# Patient Record
(demographics unavailable — no encounter records)

---

## 2025-01-07 NOTE — CONSULT LETTER
[Dear  ___] : Dear  [unfilled], [Consult Letter:] : I had the pleasure of evaluating your patient, [unfilled]. [( Thank you for referring [unfilled] for consultation for _____ )] : Thank you for referring [unfilled] for consultation for [unfilled] [Please see my note below.] : Please see my note below. [Consult Closing:] : Thank you very much for allowing me to participate in the care of this patient.  If you have any questions, please do not hesitate to contact me. [Sincerely,] : Sincerely, [FreeTextEntry2] : Dr. Arabella Conte (PCP/Ref) [FreeTextEntry3] : Deo Hill MD, MPH \par  System Director of Thoracic Surgery \par  Director of Comprehensive Lung and Foregut [place] \par  Professor Cardiovascular & Thoracic Surgery \par  [facility] [employer] at [facility]\par  \par

## 2025-01-07 NOTE — ASSESSMENT
[FreeTextEntry1] : Mr. TOMAS SOTO, 82 year old male, former smoker, w/ hx of HTN, congenital heart disease s/p open heart sx in 1960s, COPD, who presented to his PCP Dr. Arabella Conte with cough x 6 months, was sent for CT scan.   Now 2yrs 6mons s/p Rt VATS, R.A., lysis of pulmonary adhesion, wedge rxn of RLL, MLND, wedge rxn of additional RLL staple margin on 7/27/22. Path revealed lepidic predominant AdenoCA, non-mucinous type with area of invasion showing acinar and micropapillary patterns, 1.1 cm, G1, all margins and LNs are negative, [id number], Stg IA1.  [place] [facility] [place] [facility] [place] The ascending thoracic aorta is dilated measuring 4.1 cm, unchanged heart size within normal - There is a mild degree of emphysema. - There is mild to moderate bronchiolectasis and scarring in the left lung, unchanged.  - There is atelectasis in the left lower lobe posteriorly. - There are stable nodules for example 7 mm nodule left upper lobe series 5 image 60.  - There is stable nodular thickening on the oblique fissure in the right lung there is a subpleural area of scarring in the anterior right middle lobe, unchanged. - The previously described nodule in the anterior right lower lobe is not visualized although evaluation on the current study is limited due to motion artifact.  - There are stable postoperative changes with surgical sutures and scarring in the medial right lower lobe. - There is stable nodularity and scarring in the lung apices. - There is mild left pleural thickening and a small left pleural effusion. - There is a 5.2 cm cyst in the upper pole left kidney.   I have reviewed the patient's medical records and diagnostic images at time of this office consultation and have made the following recommendation: 1.

## 2025-01-07 NOTE — HISTORY OF PRESENT ILLNESS
[FreeTextEntry1] : Mr. TOMAS SOTO, 82 year old male, former smoker, w/ hx of HTN, congenital heart disease s/p open heart sx in 1960s, COPD, who presented to his PCP Dr. Arabella Conte with cough x 6 months, was sent for CT scan.   CT chest on 06/13/2022:  - a subsolid nodule in RLL (3:126) with the groundglass component measuring 10 mm and internal solid component measuring 6 mm.  - scarring, bronchiectasis, bronchial mucoid impaction MATT, RML, lingula, and rul.  - calcified right paratracheal and right hilar LNs  PFTs on 6/25/22: %, FEV1 131%, DLCO 76%.  PET/CT on 6/30/22: - there is decreased in size 1.9 cm SUV=3.7 spiculated nodular opacity at the Lt lung apex, likely due to inflammation - there is 1 cm SUV=1.6 subsolid nodule in medial RLL, suspicious for low grade neoplasm - a few bilateral renal cysts, up to 4.8 cm on the Lt  - moderate to severe prostate gland  Now 2yrs 6mons s/p Rt VATS, R.A., lysis of pulmonary adhesion, wedge rxn of RLL, MLND, wedge rxn of additional RLL staple margin on 7/27/22. Path revealed lepidic predominant AdenoCA, non-mucinous type with area of invasion showing acinar and micropapillary patterns, 1.1 cm, G1, all margins and LNs are negative, [id number], Stg IA1.  CT Chest on 11/2/23 at [facility]: - post-op changes - pulmonary opacification within the subpleural superior segment of the LLL measuring up to 6.9 x 1.6 cm in maximal dimension on the axial imaging (5, 125), not significantly changed since November 22, 2022 (but new as compared with more remote prior imaging); please note that there is associated volume loss, architectural distortion, and bronchiectasis (9, 37).  - There is a new 4 mm RLL nodule (5, 175). - There are unchanged sub-centimeter calcified granulomas within the lungs. - There is persistent biapical pleuroparenchymal thickening/scarring.  - There is a persistent trace loculated right pleural effusion, as on prior imaging. There is no left pleural effusion; no pneumothorax on either side.  CT Chest on 4/10/24 at [facility]: - post-op changes with volume loss, scarring and small amount of loculated fluid (series 2, image 131). - Left greater than right apical bulla are noted. - Stable left apical nodular scarring, architectural distortion, volume loss and bronchiectasis. There is also subpleural scarring bronchiectasis within the posterior superior segment of the left lower lobe, unchanged. - Previously noted new bilateral 4 mm right lower lobe nodule is no longer visualized, however there is a new medial right lower lobe 6 mm nodule (series 3, image 173). - Stable 3 mm anterior right lower lobe nodule (series 3, image 77). - No pneumothorax. - No pathologically enlarged lymph nodes. Calcified mediastinal and right hilar lymph nodes are seen. - Stable left renal 5.4 cm cyst.  CT chest on 7/11/24 at [facility]: - Postoperative changes of the right lower lobe are again visualized.  - Mild to moderate emphysematous changes are seen.  - Patchy areas of bilateral scarring and/or atelectasis are visualized, with architectural distortion and mild to moderate bronchiectasis, worse involving the left lung, similar to the previous examination. Dependent atelectasis/consolidation is seen within the superior segment of the left lower lobe, with air bronchograms, not significantly changed. - A nodule is visualized within the right lower lobe anteriorly (series 5 image 114), measuring 3 mm, not significantly changed.  - A nodule is visualized within the right lower lobe posterior medially (series 5 image 58), measuring 5 mm, not significantly changed.  - Several additional smaller nodules are seen, not significantly changed. No new nodules are visualized. - Calcified mediastinal and right hilar lymph nodes are seen. No noncalcified enlarged mediastinal or hilar lymph nodes are seen.  CT chest on 1/06/2025 at [facility]: - The ascending thoracic aorta is dilated measuring 4.1 cm, unchanged heart size within normal - There is a mild degree of emphysema. - There is mild to moderate bronchiolectasis and scarring in the left lung, unchanged.  - There is atelectasis in the left lower lobe posteriorly. - There are stable nodules for example 7 mm nodule left upper lobe series 5 image 60.  - There is stable nodular thickening on the oblique fissure in the right lung there is a subpleural area of scarring in the anterior right middle lobe, unchanged. - The previously described nodule in the anterior right lower lobe is not visualized although evaluation on the current study is limited due to motion artifact.  - There are stable postoperative changes with surgical sutures and scarring in the medial right lower lobe. - There is stable nodularity and scarring in the lung apices. - There is mild left pleural thickening and a small left pleural effusion. - There is a 5.2 cm cyst in the upper pole left kidney.   Pt presents today for follow up.

## 2025-01-12 NOTE — CONSULT LETTER
[FreeTextEntry2] : Dr. Arabella Conte (PCP/Ref) [FreeTextEntry3] : Deo Hill MD, MPH \par  System Director of Thoracic Surgery \par  Director of Comprehensive Lung and Foregut [place] \par  Professor Cardiovascular & Thoracic Surgery \par  [facility] [employer] at [facility]\par  \par

## 2025-01-12 NOTE — HISTORY OF PRESENT ILLNESS
[FreeTextEntry1] : Mr. TOMAS SOTO, 82 year old male, former smoker, w/ hx of HTN, congenital heart disease s/p open heart sx in 1960s, COPD, who presented to his PCP Dr. Arabella Conte with cough x 6 months, was sent for CT scan.   CT chest on 06/13/2022:  - a subsolid nodule in RLL (3:126) with the groundglass component measuring 10 mm and internal solid component measuring 6 mm.  - scarring, bronchiectasis, bronchial mucoid impaction MATT, RML, lingula, and rul.  - calcified right paratracheal and right hilar LNs  PFTs on 6/25/22: %, FEV1 131%, DLCO 76%.  PET/CT on 6/30/22: - there is decreased in size 1.9 cm SUV=3.7 spiculated nodular opacity at the Lt lung apex, likely due to inflammation - there is 1 cm SUV=1.6 subsolid nodule in medial RLL, suspicious for low grade neoplasm - a few bilateral renal cysts, up to 4.8 cm on the Lt  - moderate to severe prostate gland  Now 2.5 yrs s/p Rt VATS, R.A., lysis of pulmonary adhesion, wedge rxn of RLL, MLND, wedge rxn of additional RLL staple margin on 7/27/22. Path revealed lepidic predominant AdenoCA, non-mucinous type with area of invasion showing acinar and micropapillary patterns, 1.1 cm, G1, all margins and LNs are negative, [id number], Stg IA1.  CT Chest on 4/10/24 [place] [facility] [place]: - post-op changes with volume loss, scarring and small amount of loculated fluid (series 2, image 131). - Left greater than right apical bulla are noted. - Stable left apical nodular scarring, architectural distortion, volume loss and bronchiectasis. There is also subpleural scarring bronchiectasis within the posterior superior segment of the left lower lobe, unchanged. - Previously noted new bilateral 4 mm right lower lobe nodule is no longer visualized, however there is a new medial right lower lobe 6 mm nodule (series 3, image 173). - Stable 3 mm anterior right lower lobe nodule (series 3, image 77). - No pneumothorax. - No pathologically enlarged lymph nodes. Calcified mediastinal and right hilar lymph nodes are seen. - Stable left renal 5.4 cm cyst.  CT chest on 7/11/24 at [facility]: - Postoperative changes of the right lower lobe are again visualized.  - patchy areas of bilateral scarring and/or atelectasis are visualized, with architectural distortion and mild to moderate bronchiectasis, worse involving the left lung, similar to the previous examination.  - stable nodules are visualized within the right lower lobe measuring 3 mm, 5mm - Calcified mediastinal and right hilar lymph nodes are seen. No noncalcified enlarged mediastinal or hilar lymph nodes are seen.  CT chest on 1/06/2025 at [facility]: - ascending thoracic aorta is dilated measuring 4.1 cm, unchanged heart size within normal - there are stable nodules for example 7 mm nodule left upper lobe series 5 image 60.  - there is stable nodular thickening on the oblique fissure in the right lung there is a subpleural area of scarring in the anterior right middle lobe - The previously described nodule in the anterior right lower lobe is not visualized although evaluation on the current study is limited due to motion artifact.  - there is mild left pleural thickening and a small left pleural effusion. - there is a 5.2 cm cyst in the upper pole left kidney.   Pt presents today for follow up.

## 2025-01-12 NOTE — ASSESSMENT
[FreeTextEntry1] : Mr. TOMAS SOTO, 82 year old male, former smoker, w/ hx of HTN, congenital heart disease s/p open heart sx in 1960s, COPD, who presented to his PCP Dr. Arabella Cotne with cough x 6 months, was sent for CT scan.   Now 2.5 yrs s/p Rt VATS, R.A., lysis of pulmonary adhesion, wedge rxn of RLL, MLND, wedge rxn of additional RLL staple margin on 7/27/22. Path revealed lepidic predominant AdenoCA, non-mucinous type with area of invasion showing acinar and micropapillary patterns, 1.1 cm, G1, all margins and LNs are negative, [id number], Stg IA1.  [place] chest on 1/06/2025 [place] [facility] [place] - ascending thoracic aorta is dilated measuring 4.1 cm, unchanged heart size within normal - there are stable nodules for example 7 mm nodule left upper lobe series 5 image 60.  - there is stable nodular thickening on the oblique fissure in the right lung there is a subpleural area of scarring in the anterior right middle lobe - The previously described nodule in the anterior right lower lobe is not visualized although evaluation on the current study is limited due to motion artifact.  - there is mild left pleural thickening and a small left pleural effusion. - there is a 5.2 cm cyst in the upper pole left kidney.   I have reviewed the patient's medical records and diagnostic images at time of this office consultation and have made the following recommendation: 1.

## 2025-01-17 NOTE — CONSULT LETTER
[FreeTextEntry2] : Dr. Arabella Conte (PCP/Ref) [FreeTextEntry3] : Deo Hill MD, MPH \par  System Director of Thoracic Surgery \par  Director of Comprehensive Lung and Foregut [place] \par  Professor Cardiovascular & Thoracic Surgery \par  [facility] [employer] at [facility]\par  \par

## 2025-01-17 NOTE — HISTORY OF PRESENT ILLNESS
[FreeTextEntry1] : Mr. TOMAS SOTO, 82 year old male, former smoker, w/ hx of HTN, congenital heart disease s/p open heart sx in 1960s, COPD, who presented to his PCP Dr. Arabella Conte with cough x 6 months, was sent for CT scan.   CT chest on 06/13/2022:  - a subsolid nodule in RLL (3:126) with the groundglass component measuring 10 mm and internal solid component measuring 6 mm.  - scarring, bronchiectasis, bronchial mucoid impaction MATT, RML, lingula, and rul.  - calcified right paratracheal and right hilar LNs  PFTs on 6/25/22: %, FEV1 131%, DLCO 76%.  PET/CT on 6/30/22: - there is decreased in size 1.9 cm SUV=3.7 spiculated nodular opacity at the Lt lung apex, likely due to inflammation - there is 1 cm SUV=1.6 subsolid nodule in medial RLL, suspicious for low grade neoplasm - a few bilateral renal cysts, up to 4.8 cm on the Lt  - moderate to severe prostate gland  Now 2.5 yrs s/p Rt VATS, R.A., lysis of pulmonary adhesion, wedge rxn of RLL, MLND, wedge rxn of additional RLL staple margin on 7/27/22. Path revealed lepidic predominant AdenoCA, non-mucinous type with area of invasion showing acinar and micropapillary patterns, 1.1 cm, G1, all margins and LNs are negative, [id number], Stg IA1.  CT Chest on 4/10/24 at [facility] [place]: - post-op changes with volume loss, scarring and small amount of loculated fluid (series 2, image 131). - Left greater than right apical bulla are noted. - Stable left apical nodular scarring, architectural distortion, volume loss and bronchiectasis. There is also subpleural scarring bronchiectasis within the posterior superior segment of the left lower lobe, unchanged. - Previously noted new bilateral 4 mm right lower lobe nodule is no longer visualized, however there is a new medial right lower lobe 6 mm nodule (series 3, image 173). - Stable 3 mm anterior right lower lobe nodule (series 3, image 77). - No pneumothorax. - No pathologically enlarged lymph nodes. Calcified mediastinal and right hilar lymph nodes are seen. - Stable left renal 5.4 cm cyst.  CT chest on 7/11/24 at [facility]: - Postoperative changes of the right lower lobe are again visualized.  - patchy areas of bilateral scarring and/or atelectasis are visualized, with architectural distortion and mild to moderate bronchiectasis, worse involving the left lung, similar to the previous examination.  - stable nodules are visualized within the right lower lobe measuring 3 mm, 5mm - Calcified mediastinal and right hilar lymph nodes are seen. No noncalcified enlarged mediastinal or hilar lymph nodes are seen.  CT chest on 1/06/2025 at [facility]: - ascending thoracic aorta is dilated measuring 4.1 cm, unchanged heart size within normal - there are stable nodules for example 7 mm nodule left upper lobe series 5 image 60.  - there is stable nodular thickening on the oblique fissure in the right lung there is a subpleural area of scarring in the anterior right middle lobe - The previously described nodule in the anterior right lower lobe is not visualized although evaluation on the current study is limited due to motion artifact.  - there is mild left pleural thickening and a small left pleural effusion. - there is a 5.2 cm cyst in the upper pole left kidney.   Pt presents today for follow up.

## 2025-01-17 NOTE — ASSESSMENT
[FreeTextEntry1] : Mr. TOMAS SOTO, 82 year old male, former smoker, w/ hx of HTN, congenital heart disease s/p open heart sx in 1960s, COPD, who presented to his PCP Dr. Arabella Conte with cough x 6 months, was sent for CT scan.   Now 2.5 yrs s/p Rt VATS, R.A., lysis of pulmonary adhesion, wedge rxn of RLL, MLND, wedge rxn of additional RLL staple margin on 7/27/22. Path revealed lepidic predominant AdenoCA, non-mucinous type with area of invasion showing acinar and micropapillary patterns, 1.1 cm, G1, all margins and LNs are negative, [id number], Stg IA1.  CT chest on 1/06/2025 [place] [facility] [place] - ascending thoracic aorta is dilated measuring 4.1 cm, unchanged heart size within normal - there are stable nodules for example 7 mm nodule left upper lobe series 5 image 60.  - there is stable nodular thickening on the oblique fissure in the right lung there is a subpleural area of scarring in the anterior right middle lobe - The previously described nodule in the anterior right lower lobe is not visualized although evaluation on the current study is limited due to motion artifact.  - there is mild left pleural thickening and a small left pleural effusion. - there is a 5.2 cm cyst in the upper pole left kidney.   I have reviewed the patient's medical records and diagnostic images at time of this office consultation and have made the following recommendation: 1. CT reviewed with pt, stable nodules. RTC in 6 mons with CT Chest w/o contrast   I, MELBA Snider, personally performed the evaluation and management (E/M) services for this established patient who presents today with (a) new problem(s)/exacerbation of (an) existing condition(s).  That E/M includes conducting the examination, assessing all new/exacerbated conditions, and establishing a new plan of care.  Today, my ACP, EMMETT De Los Santos was here to observe my evaluation and management services for this new problem/exacerbated condition to be followed going forward.

## 2025-01-17 NOTE — CONSULT LETTER
[FreeTextEntry2] : Dr. Arabella Conte (PCP/Ref) [FreeTextEntry3] : Deo Hill MD, MPH \par  System Director of Thoracic Surgery \par  Director of Comprehensive Lung and Foregut [place] \par  Professor Cardiovascular & Thoracic Surgery \par  [facility] [employer] at [facility]\par  \par

## 2025-01-17 NOTE — ASSESSMENT
[FreeTextEntry1] : Mr. TOMAS SOTO, 82 year old male, former smoker, w/ hx of HTN, congenital heart disease s/p open heart sx in 1960s, COPD, who presented to his PCP Dr. Arabella Conte with cough x 6 months, was sent for CT scan.   Now 2.5 yrs s/p Rt VATS, R.A., lysis of pulmonary adhesion, wedge rxn of RLL, MLND, wedge rxn of additional RLL staple margin on 7/27/22. Path revealed lepidic predominant AdenoCA, non-mucinous type with area of invasion showing acinar and micropapillary patterns, 1.1 cm, G1, all margins and LNs are negative, [id number], Stg IA1.  [place] [facility] [place] ascending thoracic aorta is dilated measuring 4.1 cm, unchanged heart size within normal - there are stable nodules for example 7 mm nodule left upper lobe series 5 image 60.  - there is stable nodular thickening on the oblique fissure in the right lung there is a subpleural area of scarring in the anterior right middle lobe - The previously described nodule in the anterior right lower lobe is not visualized although evaluation on the current study is limited due to motion artifact.  - there is mild left pleural thickening and a small left pleural effusion. - there is a 5.2 cm cyst in the upper pole left kidney.   I have reviewed the patient's medical records and diagnostic images at time of this office consultation and have made the following recommendation: 1. CT reviewed with pt, stable nodules. RTC in 6 mons with CT Chest w/o contrast   I, MELBA Snider, personally performed the evaluation and management (E/M) services for this established patient who presents today with (a) new problem(s)/exacerbation of (an) existing condition(s).  That E/M includes conducting the examination, assessing all new/exacerbated conditions, and establishing a new plan of care.  Today, my ACP, EMMETT De Los Santos was here to observe my evaluation and management services for this new problem/exacerbated condition to be followed going forward.

## 2025-01-17 NOTE — HISTORY OF PRESENT ILLNESS
[FreeTextEntry1] : Mr. TOMAS SOTO, 82 year old male, former smoker, w/ hx of HTN, congenital heart disease s/p open heart sx in 1960s, COPD, who presented to his PCP Dr. Arabella Conte with cough x 6 months, was sent for CT scan.   CT chest on 06/13/2022:  - a subsolid nodule in RLL (3:126) with the groundglass component measuring 10 mm and internal solid component measuring 6 mm.  - scarring, bronchiectasis, bronchial mucoid impaction MATT, RML, lingula, and rul.  - calcified right paratracheal and right hilar LNs  PFTs on 6/25/22: %, FEV1 131%, DLCO 76%.  PET/CT on 6/30/22: - there is decreased in size 1.9 cm SUV=3.7 spiculated nodular opacity at the Lt lung apex, likely due to inflammation - there is 1 cm SUV=1.6 subsolid nodule in medial RLL, suspicious for low grade neoplasm - a few bilateral renal cysts, up to 4.8 cm on the Lt  - moderate to severe prostate gland  Now 2.5 yrs s/p Rt VATS, R.A., lysis of pulmonary adhesion, wedge rxn of RLL, MLND, wedge rxn of additional RLL staple margin on 7/27/22. Path revealed lepidic predominant AdenoCA, non-mucinous type with area of invasion showing acinar and micropapillary patterns, 1.1 cm, G1, all margins and LNs are negative, [id number], Stg IA1.  [place] [facility] [place] changes with volume loss, scarring and small amount of loculated fluid (series 2, image 131). - Left greater than right apical bulla are noted. - Stable left apical nodular scarring, architectural distortion, volume loss and bronchiectasis. There is also subpleural scarring bronchiectasis within the posterior superior segment of the left lower lobe, unchanged. - Previously noted new bilateral 4 mm right lower lobe nodule is no longer visualized, however there is a new medial right lower lobe 6 mm nodule (series 3, image 173). - Stable 3 mm anterior right lower lobe nodule (series 3, image 77). - No pneumothorax. - No pathologically enlarged lymph nodes. Calcified mediastinal and right hilar lymph nodes are seen. - Stable left renal 5.4 cm cyst.  CT chest on 7/11/24 at [facility]: - Postoperative changes of the right lower lobe are again visualized.  - patchy areas of bilateral scarring and/or atelectasis are visualized, with architectural distortion and mild to moderate bronchiectasis, worse involving the left lung, similar to the previous examination.  - stable nodules are visualized within the right lower lobe measuring 3 mm, 5mm - Calcified mediastinal and right hilar lymph nodes are seen. No noncalcified enlarged mediastinal or hilar lymph nodes are seen.  CT chest on 1/06/2025 at [facility]: - ascending thoracic aorta is dilated measuring 4.1 cm, unchanged heart size within normal - there are stable nodules for example 7 mm nodule left upper lobe series 5 image 60.  - there is stable nodular thickening on the oblique fissure in the right lung there is a subpleural area of scarring in the anterior right middle lobe - The previously described nodule in the anterior right lower lobe is not visualized although evaluation on the current study is limited due to motion artifact.  - there is mild left pleural thickening and a small left pleural effusion. - there is a 5.2 cm cyst in the upper pole left kidney.   Pt presents today for follow up.

## 2025-07-18 NOTE — HISTORY OF PRESENT ILLNESS
[FreeTextEntry1] : Mr. TOMAS SOTO, 83 year old male, former smoker, w/ hx of HTN, congenital heart disease s/p open heart sx in 1960s, COPD, who presented to his PCP Dr. Arabella Conte with cough x 6 months, was sent for CT scan.   CT chest on 06/13/2022:  - a subsolid nodule in RLL (3:126) with the groundglass component measuring 10 mm and internal solid component measuring 6 mm.  - scarring, bronchiectasis, bronchial mucoid impaction MATT, RML, lingula, and rul.  - calcified right paratracheal and right hilar LNs  PFTs on 6/25/22: %, FEV1 131%, DLCO 76%.  PET/CT on 6/30/22: - there is decreased in size 1.9 cm SUV=3.7 spiculated nodular opacity at the Lt lung apex, likely due to inflammation - there is 1 cm SUV=1.6 subsolid nodule in medial RLL, suspicious for low grade neoplasm - a few bilateral renal cysts, up to 4.8 cm on the Lt  - moderate to severe prostate gland  Now 3 yrs s/p Rt VATS, R.A., lysis of pulmonary adhesion, wedge rxn of RLL, MLND, wedge rxn of additional RLL staple margin on 7/27/22. Path revealed lepidic predominant AdenoCA, non-mucinous type with area of invasion showing acinar and micropapillary patterns, 1.1 cm, G1, all margins and LNs are negative, [id number], Stg IA1.  CT chest on 7/11/24 at [facility]: - Postoperative changes of the right lower lobe are again visualized.  - patchy areas of bilateral scarring and/or atelectasis are visualized, with architectural distortion and mild to moderate bronchiectasis, worse involving the left lung, similar to the previous examination.  - stable nodules are visualized within the right lower lobe measuring 3 mm, 5mm - Calcified mediastinal and right hilar lymph nodes are seen. No noncalcified enlarged mediastinal or hilar lymph nodes are seen.  CT chest on 1/06/2025 at [facility]: - ascending thoracic aorta is dilated measuring 4.1 cm, unchanged heart size within normal - there are stable nodules for example 7 mm nodule left upper lobe series 5 image 60.  - there is stable nodular thickening on the oblique fissure in the right lung there is a subpleural area of scarring in the anterior right middle lobe - The previously described nodule in the anterior right lower lobe is not visualized although evaluation on the current study is limited due to motion artifact.  - there is mild left pleural thickening and a small left pleural effusion. - there is a 5.2 cm cyst in the upper pole left kidney.   CT chest on 7/10/25 at [facility]:  - post-op changes - stable 2 cm ill-defined RML subpleural opacity - stable 4 mm MATT (3:54) - stable 1.4 cm MATT  - stable small partially loculated Rt pleural effusion  Pt presents today for follow up.

## 2025-07-18 NOTE — CONSULT LETTER
[Dear  ___] : Dear  [unfilled], [Consult Letter:] : I had the pleasure of evaluating your patient, [unfilled]. [( Thank you for referring [unfilled] for consultation for _____ )] : Thank you for referring [unfilled] for consultation for [unfilled] [Please see my note below.] : Please see my note below. [Consult Closing:] : Thank you very much for allowing me to participate in the care of this patient.  If you have any questions, please do not hesitate to contact me. [Sincerely,] : Sincerely, [FreeTextEntry2] : Dr. Arabella Conte (PCP/Ref) [FreeTextEntry3] : Deo Hill MD, MPH \par  System Director of Thoracic Surgery \par  Director of Comprehensive Lung and Foregut [place] \par  Professor Cardiovascular & Thoracic Surgery \par  [facility] [employer] at [facility]\par  \par

## 2025-07-18 NOTE — HISTORY OF PRESENT ILLNESS
[FreeTextEntry1] : Mr. TOMAS SOTO, 83 year old male, former smoker, w/ hx of HTN, congenital heart disease s/p open heart sx in 1960s, COPD, who presented to his PCP Dr. Arabella Conte with cough x 6 months, was sent for CT scan.   CT chest on 06/13/2022:  - a subsolid nodule in RLL (3:126) with the groundglass component measuring 10 mm and internal solid component measuring 6 mm.  - scarring, bronchiectasis, bronchial mucoid impaction MATT, RML, lingula, and rul.  - calcified right paratracheal and right hilar LNs  PFTs on 6/25/22: %, FEV1 131%, DLCO 76%.  PET/CT on 6/30/22: - there is decreased in size 1.9 cm SUV=3.7 spiculated nodular opacity at the Lt lung apex, likely due to inflammation - there is 1 cm SUV=1.6 subsolid nodule in medial RLL, suspicious for low grade neoplasm - a few bilateral renal cysts, up to 4.8 cm on the Lt  - moderate to severe prostate gland  Now 3 yrs s/p Rt VATS, R.A., lysis of pulmonary adhesion, wedge rxn of RLL, MLND, wedge rxn of additional RLL staple margin on 7/27/22. Path revealed lepidic predominant AdenoCA, non-mucinous type with area of invasion showing acinar and micropapillary patterns, 1.1 cm, G1, all margins and LNs are negative, [id number], Stg IA1.  CT chest on 7/11/24 at [facility]: - Postoperative changes of the right lower lobe are again visualized.  - patchy areas of bilateral scarring and/or atelectasis are visualized, with architectural distortion and mild to moderate bronchiectasis, worse involving the left lung, similar to the previous examination.  - stable nodules are visualized within the right lower lobe measuring 3 mm, 5mm - Calcified mediastinal and right hilar lymph nodes are seen. No noncalcified enlarged mediastinal or hilar lymph nodes are seen.  CT chest on 1/06/2025 at [facility]: - ascending thoracic aorta is dilated measuring 4.1 cm, unchanged heart size within normal - there are stable nodules for example 7 mm nodule left upper lobe series 5 image 60.  - there is stable nodular thickening on the oblique fissure in the right lung there is a subpleural area of scarring in the anterior right middle lobe - The previously described nodule in the anterior right lower lobe is not visualized although evaluation on the current study is limited due to motion artifact.  - there is mild left pleural thickening and a small left pleural effusion. - there is a 5.2 cm cyst in the upper pole left kidney.   CT chest on 7/10/25 at [facility]:  - post-op changes - stable 2 cm ill-defined RML subpleural opacity - stable 4 mm MATT (3:54) - stable 1.4 cm MATT  - stable small partially loculated Rt pleural effusion  Pt presents today for follow up.

## 2025-07-18 NOTE — CONSULT LETTER
[Dear  ___] : Dear  [unfilled], [Consult Letter:] : I had the pleasure of evaluating your patient, [unfilled]. [( Thank you for referring [unfilled] for consultation for _____ )] : Thank you for referring [unfilled] for consultation for [unfilled] [Please see my note below.] : Please see my note below. [Consult Closing:] : Thank you very much for allowing me to participate in the care of this patient.  If you have any questions, please do not hesitate to contact me. [Sincerely,] : Sincerely, [FreeTextEntry2] : Dr. Arabella Conte (PCP/Ref) [FreeTextEntry3] : Deo Hill MD, MPH \par  System Director of Thoracic Surgery \par  Director of Comprehensive Lung and Foregut [place] \par  Professor Cardiovascular & Thoracic Surgery \par  [facility] [employer] [facility] at [facility]\par  \par

## 2025-07-18 NOTE — CONSULT LETTER
[Dear  ___] : Dear  [unfilled], [Consult Letter:] : I had the pleasure of evaluating your patient, [unfilled]. [( Thank you for referring [unfilled] for consultation for _____ )] : Thank you for referring [unfilled] for consultation for [unfilled] [Please see my note below.] : Please see my note below. [Consult Closing:] : Thank you very much for allowing me to participate in the care of this patient.  If you have any questions, please do not hesitate to contact me. [Sincerely,] : Sincerely, [FreeTextEntry2] : Dr. Arabella Conte (PCP/Ref) [FreeTextEntry3] : Deo Hill MD, MPH \par  System Director of Thoracic Surgery \par  Director of Comprehensive Lung and Foregut [place] \par  Professor Cardiovascular & Thoracic Surgery \par  [facility] [employer] [facility] at [facility]\par  \par

## 2025-07-18 NOTE — ASSESSMENT
[FreeTextEntry1] : Mr. TOMAS SOTO, 83 year old male, former smoker, w/ hx of HTN, congenital heart disease s/p open heart sx in 1960s, COPD, who presented to his PCP Dr. Arabella Conte with cough x 6 months, was sent for CT scan.   Now 3 yrs s/p Rt VATS, R.A., lysis of pulmonary adhesion, wedge rxn of RLL, MLND, wedge rxn of additional RLL staple margin on 7/27/22. Path revealed lepidic predominant AdenoCA, non-mucinous type with area of invasion showing acinar and micropapillary patterns, 1.1 cm, G1, all margins and LNs are negative, [id number], Stg IA1.  CT chest on 7/10/25 at [facility]:  - post-op changes - stable 2 cm ill-defined RML subpleural opacity - stable 4 mm MATT (3:54) - stable 1.4 cm MATT  - stable small partially loculated Rt pleural effusion  I have reviewed the patient's medical records and diagnostic images at time of this office consultation and have made the following recommendation: 1. CT chest reviewed and explained to patient, stable scan, I recommended patient to return to office in 12 months with CT Chest without contrast.  I, MELBA Snider, personally performed the evaluation and management (E/M) services for this established patient who follow up today with an existing condition.  That E/M includes conducting the examination, assessing all new/exacerbated/existing conditions, and establishing a plan of care.  Today, my ACP, PRASAD Silveira, was here to observe my evaluation and management services for this existing condition to be followed going forward.

## 2025-07-18 NOTE — HISTORY OF PRESENT ILLNESS
[FreeTextEntry1] : Mr. TOMAS SOTO, 83 year old male, former smoker, w/ hx of HTN, congenital heart disease s/p open heart sx in 1960s, COPD, who presented to his PCP Dr. Arabella Conte with cough x 6 months, was sent for CT scan.   CT chest on 06/13/2022:  - a subsolid nodule in RLL (3:126) with the groundglass component measuring 10 mm and internal solid component measuring 6 mm.  - scarring, bronchiectasis, bronchial mucoid impaction MATT, RML, lingula, and rul.  - calcified right paratracheal and right hilar LNs  PFTs on 6/25/22: %, FEV1 131%, DLCO 76%.  PET/CT on 6/30/22: - there is decreased in size 1.9 cm SUV=3.7 spiculated nodular opacity at the Lt lung apex, likely due to inflammation - there is 1 cm SUV=1.6 subsolid nodule in medial RLL, suspicious for low grade neoplasm - a few bilateral renal cysts, up to 4.8 cm on the Lt  - moderate to severe prostate gland  Now 3 yrs s/p Rt VATS, R.A., lysis of pulmonary adhesion, wedge rxn of RLL, MLND, wedge rxn of additional RLL staple margin on 7/27/22. Path revealed lepidic predominant AdenoCA, non-mucinous type with area of invasion showing acinar and micropapillary patterns, 1.1 cm, G1, all margins and LNs are negative, [id number], Stg IA1.  CT chest on 7/11/24 at [facility]: - Postoperative changes of the right lower lobe are again visualized.  - patchy areas of bilateral scarring and/or atelectasis are visualized, with architectural distortion and mild to moderate bronchiectasis, worse involving the left lung, similar to the previous examination.  - stable nodules are visualized within the right lower lobe measuring 3 mm, 5mm - Calcified mediastinal and right hilar lymph nodes are seen. No noncalcified enlarged mediastinal or hilar lymph nodes are seen.  CT chest on 1/06/2025 at [facility]: - ascending thoracic aorta is dilated measuring 4.1 cm, unchanged heart size within normal - there are stable nodules for example 7 mm nodule left upper lobe series 5 image 60.  - there is stable nodular thickening on the oblique fissure in the right lung there is a subpleural area of scarring in the anterior right middle lobe - The previously described nodule in the anterior right lower lobe is not visualized although evaluation on the current study is limited due to motion artifact.  - there is mild left pleural thickening and a small left pleural effusion. - there is a 5.2 cm cyst in the upper pole left kidney.   CT chest on 7/10/25 at [facility]:  - post-op changes - stable 2 cm ill-defined RML subpleural opacity - stable 4 mm MATT (3:54) - stable 1.4 cm MATT  - stable small partially loculated Rt pleural effusion  Pt presents today for follow up.

## 2025-07-18 NOTE — ASSESSMENT
[FreeTextEntry1] : Mr. TOMAS SOTO, 83 year old male, former smoker, w/ hx of HTN, congenital heart disease s/p open heart sx in 1960s, COPD, who presented to his PCP Dr. Arabella Conte with cough x 6 months, was sent for CT scan.   Now 3 yrs s/p Rt VATS, R.A., lysis of pulmonary adhesion, wedge rxn of RLL, MLND, wedge rxn of additional RLL staple margin on 7/27/22. Path revealed lepidic predominant AdenoCA, non-mucinous type with area of invasion showing acinar and micropapillary patterns, 1.1 cm, G1, all margins and LNs are negative, [id number], Stg IA1.  CT chest on 7/10/25 at [facility]:  - post-op changes - stable 2 cm ill-defined RML subpleural opacity - stable 4 mm MATT (3:54) - stable 1.4 cm MATT  - stable small partially loculated Rt pleural effusion  I have reviewed the patient's medical records and diagnostic images at time of this office consultation and have made the following recommendation: 1. CT chest reviewed and explained to patient, stable scan, I recommended patient to return to office in 12 months with CT Chest without contrast.  I, MELBA Snider, personally performed the evaluation and management (E/M) services for this established patient who follow up today with an existing condition.  That E/M includes conducting the examination, assessing all new/exacerbated/existing conditions, and establishing a plan of care.  Today, my ACP, PRASAD Silveira, was here to observe my evaluation and management services for this existing condition to be followed going forward.

## 2025-07-18 NOTE — ASSESSMENT
[FreeTextEntry1] : Mr. TOMAS SOTO, 83 year old male, former smoker, w/ hx of HTN, congenital heart disease s/p open heart sx in 1960s, COPD, who presented to his PCP Dr. Arabella Conte with cough x 6 months, was sent for CT scan.   Now 3 yrs s/p Rt VATS, R.A., lysis of pulmonary adhesion, wedge rxn of RLL, MLND, wedge rxn of additional RLL staple margin on 7/27/22. Path revealed lepidic predominant AdenoCA, non-mucinous type with area of invasion showing acinar and micropapillary patterns, 1.1 cm, G1, all margins and LNs are negative, [id number], Stg IA1.  CT chest on 7/10/25 at [facility]:  - post-op changes - stable 2 cm ill-defined RML subpleural opacity - stable 4 mm MATT (3:54) - stable 1.4 cm MATT  - stable small partially loculated Rt pleural effusion  I have reviewed the patient's medical records and diagnostic images at time of this office consultation and have made the following recommendation: 1. CT chest reviewed and explained to patient, stable scan, I recommended patient to return to office in 12 months with CT Chest without contrast.  I, MEBLA Snider, personally performed the evaluation and management (E/M) services for this established patient who follow up today with an existing condition.  That E/M includes conducting the examination, assessing all new/exacerbated/existing conditions, and establishing a plan of care.  Today, my ACP, PRASAD Silveira, was here to observe my evaluation and management services for this existing condition to be followed going forward.  Yes